# Patient Record
Sex: FEMALE | Race: WHITE | URBAN - METROPOLITAN AREA
[De-identification: names, ages, dates, MRNs, and addresses within clinical notes are randomized per-mention and may not be internally consistent; named-entity substitution may affect disease eponyms.]

---
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
## 2018-02-06 ENCOUNTER — HOSPITAL ENCOUNTER (INPATIENT)
Facility: HOSPITAL | Age: 18
LOS: 6 days | Discharge: HOME | DRG: 737 | End: 2018-02-12
Attending: INTERNAL MEDICINE | Admitting: INTERNAL MEDICINE
Payer: COMMERCIAL

## 2018-02-06 ENCOUNTER — LAB ENCOUNTER (OUTPATIENT)
Dept: LAB | Facility: HOSPITAL | Age: 18
End: 2018-02-06
Attending: INTERNAL MEDICINE
Payer: COMMERCIAL

## 2018-02-06 ENCOUNTER — HOSPITAL ENCOUNTER (OUTPATIENT)
Dept: CT IMAGING | Facility: HOSPITAL | Age: 18
Discharge: HOME | End: 2018-02-06
Attending: INTERNAL MEDICINE
Payer: COMMERCIAL

## 2018-02-06 DIAGNOSIS — K42.9 UMBILICAL HERNIA: ICD-10-CM

## 2018-02-06 DIAGNOSIS — R10.9 ABDOMINAL PAIN: Primary | ICD-10-CM

## 2018-02-06 LAB
ALBUMIN SERPL-MCNC: 3.7 G/DL (ref 3.5–4.8)
ALP LIVER SERPL-CCNC: 76 U/L (ref 52–144)
ALT SERPL-CCNC: 23 U/L (ref 14–54)
AST SERPL-CCNC: 19 U/L (ref 15–41)
BILIRUB SERPL-MCNC: 0.4 MG/DL (ref 0.1–2)
BUN BLD-MCNC: 11 MG/DL (ref 8–20)
CALCIUM BLD-MCNC: 9.6 MG/DL (ref 8.3–10.3)
CHLORIDE: 103 MMOL/L (ref 101–111)
CO2: 27 MMOL/L (ref 22–32)
CREAT BLD-MCNC: 0.88 MG/DL (ref 0.55–1.02)
GLUCOSE BLD-MCNC: 80 MG/DL (ref 70–99)
M PROTEIN MFR SERPL ELPH: 8.5 G/DL (ref 6.1–8.3)
POTASSIUM SERPL-SCNC: 4.2 MMOL/L (ref 3.6–5.1)
SODIUM SERPL-SCNC: 137 MMOL/L (ref 136–144)

## 2018-02-06 PROCEDURE — 36415 COLL VENOUS BLD VENIPUNCTURE: CPT

## 2018-02-06 PROCEDURE — 74150 CT ABDOMEN W/O CONTRAST: CPT | Performed by: INTERNAL MEDICINE

## 2018-02-06 PROCEDURE — 80053 COMPREHEN METABOLIC PANEL: CPT

## 2018-02-06 RX ORDER — BETAMETHASONE DIPROPIONATE 0.5 MG/G
CREAM TOPICAL 2 TIMES DAILY
Status: DISCONTINUED | OUTPATIENT
Start: 2018-02-06 | End: 2018-02-12

## 2018-02-06 RX ORDER — ACETAMINOPHEN 325 MG/1
650 TABLET ORAL EVERY 4 HOURS PRN
Status: DISPENSED | OUTPATIENT
Start: 2018-02-06 | End: 2018-02-07

## 2018-02-07 ENCOUNTER — APPOINTMENT (OUTPATIENT)
Dept: ULTRASOUND IMAGING | Facility: HOSPITAL | Age: 18
DRG: 737 | End: 2018-02-07
Attending: INTERNAL MEDICINE
Payer: COMMERCIAL

## 2018-02-07 ENCOUNTER — APPOINTMENT (OUTPATIENT)
Dept: CT IMAGING | Facility: HOSPITAL | Age: 18
DRG: 737 | End: 2018-02-07
Attending: PHYSICIAN ASSISTANT
Payer: COMMERCIAL

## 2018-02-07 LAB
AFP TUMOR MARKER: 5.7 NG/ML (ref ?–8)
ALBUMIN, OTHER BODY FLUID: 3.4 G/DL
AMYLASE, OTHER BODY FLUID: 27 U/L
AMYLASE: 35 U/L (ref 25–115)
APTT PPP: 28.6 SECONDS (ref 25–34)
BASOPHIL PERITONEAL FLUID: 0 %
BASOPHILS # BLD AUTO: 0.06 X10(3) UL (ref 0–0.1)
BASOPHILS NFR BLD AUTO: 0.6 %
C-REACTIVE PROTEIN: 1.28 MG/DL (ref ?–1)
CANCER AG 125 (CA125): 296.5 U/ML (ref ?–35)
CEA: 0.4 NG/ML (ref 0.5–5)
CMV AB, IGM: NEGATIVE
CYTOMEGALOVIRUS AB, IGG: NEGATIVE
EBNA: NEGATIVE
EBV VCA IGG: POSITIVE
EBV VCA IGM: NEGATIVE
EOSINOPHIL # BLD AUTO: 0.17 X10(3) UL (ref 0–0.3)
EOSINOPHIL NFR BLD AUTO: 1.7 %
EOSINOPHILS PERITONEAL FLUID: 0 %
ERYTHROCYTE [DISTWIDTH] IN BLOOD BY AUTOMATED COUNT: 13.3 % (ref 11.5–16)
GLUCOSE PERITONEAL FLUID: 85 MG/DL
HAV IGM SER QL: NONREACTIVE
HBV CORE IGM SER QL: NONREACTIVE
HBV SURFACE AG SERPL QL IA: NONREACTIVE
HCT VFR BLD AUTO: 43.7 % (ref 34–50)
HEPATITIS C VIRUS AB INTERPRETATION: NONREACTIVE
HGB BLD-MCNC: 14.3 G/DL (ref 12–16)
IMMATURE GRANULOCYTE COUNT: 0.03 X10(3) UL (ref 0–1)
IMMATURE GRANULOCYTE RATIO %: 0.3 %
INR BLD: 1.07 (ref 0.89–1.11)
LDH PERITONEAL FLUID: 164 U/L
LDH: 194 U/L (ref 84–246)
LIPASE: 133 U/L (ref 73–393)
LYMPHOCYTE PERITONEAL FLUID: 38 %
LYMPHOCYTES # BLD AUTO: 2.64 X10(3) UL (ref 0.9–4)
LYMPHOCYTES NFR BLD AUTO: 27.1 %
Lab: 65 MG/DL
MCH RBC QN AUTO: 27.8 PG (ref 27–33.2)
MCHC RBC AUTO-ENTMCNC: 32.7 G/DL (ref 31–37)
MCV RBC AUTO: 84.9 FL (ref 81–100)
MONO/MAC/HISTIO PERITONEAL: 57 %
MONOCYTES # BLD AUTO: 0.6 X10(3) UL (ref 0.1–0.6)
MONOCYTES NFR BLD AUTO: 6.2 %
NEUTROPHIL ABS PRELIM: 6.25 X10 (3) UL (ref 1.3–6.7)
NEUTROPHILS # BLD AUTO: 6.25 X10(3) UL (ref 1.3–6.7)
NEUTROPHILS NFR BLD AUTO: 64.1 %
NEUTROPHILS PERITONEAL FLUID: 5 %
PLATELET # BLD AUTO: 504 10(3)UL (ref 150–450)
PRO-BETA NATRIURETIC PEPTIDE: 42 PG/ML (ref ?–125)
PSA SERPL DL<=0.01 NG/ML-MCNC: 13.9 SECONDS (ref 12–14.3)
RBC # BLD AUTO: 5.15 X10(6)UL (ref 3.8–5.1)
RBC PERITONEAL FLUID: <3000 /MM3
RED CELL DISTRIBUTION WIDTH-SD: 41.8 FL (ref 35.1–46.3)
SED RATE-ML: 12 MM/HR (ref 0–25)
TOTAL CELLS COUNTED: 100
TOTAL PROTEIN, OTHER BODY FLUID: 6 G/DL
WBC # BLD AUTO: 9.8 X10(3) UL (ref 4–13)
WBC PERITONEAL FLUID: 552 /MM3

## 2018-02-07 PROCEDURE — 76856 US EXAM PELVIC COMPLETE: CPT | Performed by: INTERNAL MEDICINE

## 2018-02-07 PROCEDURE — 0W9G3ZZ DRAINAGE OF PERITONEAL CAVITY, PERCUTANEOUS APPROACH: ICD-10-PCS | Performed by: RADIOLOGY

## 2018-02-07 PROCEDURE — 49083 ABD PARACENTESIS W/IMAGING: CPT | Performed by: INTERNAL MEDICINE

## 2018-02-07 PROCEDURE — 74177 CT ABD & PELVIS W/CONTRAST: CPT | Performed by: PHYSICIAN ASSISTANT

## 2018-02-07 RX ORDER — ONDANSETRON 2 MG/ML
4 INJECTION INTRAMUSCULAR; INTRAVENOUS EVERY 6 HOURS PRN
Status: DISCONTINUED | OUTPATIENT
Start: 2018-02-07 | End: 2018-02-09

## 2018-02-07 RX ORDER — ACETAMINOPHEN 325 MG/1
650 TABLET ORAL EVERY 6 HOURS PRN
Status: DISCONTINUED | OUTPATIENT
Start: 2018-02-07 | End: 2018-02-09 | Stop reason: HOSPADM

## 2018-02-07 RX ORDER — HYDROMORPHONE HYDROCHLORIDE 1 MG/ML
1 INJECTION, SOLUTION INTRAMUSCULAR; INTRAVENOUS; SUBCUTANEOUS EVERY 2 HOUR PRN
Status: DISCONTINUED | OUTPATIENT
Start: 2018-02-07 | End: 2018-02-09

## 2018-02-07 NOTE — PROGRESS NOTES
ALERT & ORIENTED X 4 . WITH TOLERABLE PAIN. KEPT NPO. ABDOMEN DISTENDED BUT SOFT. UP IN ROOM . PLAN OF CARE DISCUSSED WITH PATIENT. WILL MONITOR.

## 2018-02-07 NOTE — PAYOR COMM NOTE
--------------  ADMISSION REVIEW     Payor: [employer] [facility]  Subscriber #:  [id number]  Authorization Number: N/A    Admit date: 2/6/18  Admit time: 2005       Admitting Physician: Washington Talavera MD  Attending Physician:  Washington Talavera MD  Primary Care Ph arthroscopy in 2012 and lower extremity cellulitis in 2014. MEDICATIONS:  Triamcinolone acetomide 1.2% cream application to plaques b.i.d. as needed, Advil 200 mg as needed, Otezla 30 mg 1 tablet twice a day.     ALLERGIES:  The patient has no known drug CT of the abdomen and pelvis was read as follows: There is a large amount of ascites present in the abdomen around the liver and spleen as well as in the flanks and pericolic regions. Some fluid extends into the umbilical hernia.   A pocket of fluid in t HYDROmorphone HCl PF (DILAUDID) 1 MG/ML injection 1 mg     Date Action Dose Route User    2/7/2018 4076 Given 1 mg Intravenous Sherice Bella RN      ondansetron HCl [facility]) injection 4 mg     Date Action Dose Route User    2/7/2018 0843 Given 4 mg Intra amount of ascites. Some of the ascites fluid extends into midline abdominal wall periumbilical and supraumbilical hernias containing fluid.   No oral contrast or intravenous contrast administered with limitations thereof, but there is   no definite bowel c

## 2018-02-07 NOTE — CONSULTS
[facility]                       Gastroenterology [address] [facility]    Jose Tang Patient Status:  Inpatient    2000 MRN [id number]   [facility] 3NW-A Attending Shira Warren MD   [facility] Day # 1 PCP Dino Sawant chloride 20 mEq in sodium chloride 0.45 % 1,000 mL infusion  Intravenous Continuous   influenza vaccine split quad (FLULAVAL) ages 6 months to 65 years inj 0.5ml 0.5 mL Intramuscular Prior to discharge   [] acetaminophen (TYLENOL) tab 650 mg 650 mg kg/m²   Gen: AAO x 3, able to speak in complete sentences  HENT: EOMI, PERRL, oropharynx is clear with moist mucosal membranes  Eyes: Sclerae are anicteric  Neck:  Supple without nuchal rigidity  CV: Regular rate and rhythm, with normal S1 and S2  Resp: Cl umbilical pain. The patient also states   generalized upper abdominal pain. FINDINGS:       This scan performed without IV or oral contrast, with limitations thereof.      Large amount of ascites present in the abdomen around the liver, spleen, as w cytology, albumin, protein, glucose, LDH, triglycerides, and amylase   2. Agree with pelvic US     Thank you for the consultation, we will follow the patient with you.   WILFRED Quinn  8:51 AM  2/7/2018  [facility]  [phone]    Atte

## 2018-02-07 NOTE — PROGRESS NOTES
[facility]  Report of Consultation    Garcia Mendoza Patient Status:  Inpatient    2000 MRN [id number]   [facility] 3NW-A Attending Ger Lane MD   Hosp Day # 1 PCP nAnette Tolbert MD     Reason for Consultation:    Ulysses Meline facility-administered medications on file prior to encounter.    Current Outpatient Prescriptions on File Prior to Encounter:  Betamethasone Dipropionate 0.05 % External Ointment Apply to AA BID two weeks on two weeks off, then PRN Disp: 50 g Rfl: 3   Angely CT with complaints of vomiting bile this morning. The patient states vomiting on and off since December. The patient states umbilical pain. The patient also states generalized upper abdominal pain.     FINDINGS:   This scan performed without IV or oral cont pain, unspecified chronicity      Impression:    22 y/o with umbilical hernia, ascites   -CT scan as noted above  -afebrile, no leukocystosis, : 296    Plan:    Patient to undergo paracentesis this afternoon as well as pelvic U/S  Would consider CT a

## 2018-02-07 NOTE — H&P
[facility]    PATIENT'S NAME: Marlin Park   ATTENDING PHYSICIAN: Chapo Parikh M.D.    PATIENT ACCOUNT#:   [de-identified]    LOCATION:  [address] [place] [address]  MEDICAL RECORD #:   [id number]       YOB: 2000  ADMISSION DATE:       02/06/2018 smoked. Does not use alcohol or recreational drugs. REVIEW OF SYSTEMS:  Other 14-point review of systems is negative. Of note, the patient did begin vomiting before she began the [place].       PHYSICAL EXAMINATION:    GENERAL:  An alert white female aneurysm. There is no definite bowel-containing hernia. ASSESSMENT AND PLAN:    1. A 21year-old white female, previously healthy, now with periodic vomiting and ascites. Plan is to admit.   Gastrointestinal evaluation, probable paracentesis, and pel

## 2018-02-07 NOTE — PLAN OF CARE
GASTROINTESTINAL - ADULT    • Minimal or absence of nausea and vomiting Progressing        PAIN - ADULT    • Verbalizes/displays adequate comfort level or patient's stated pain goal Progressing        BACK FROM GI LAB S/P PARACENTESIS AT 1430. ABDOMEN LESS

## 2018-02-07 NOTE — PROGRESS NOTES
[facility]  Progress Note      Husam Araya Patient Status:  Inpatient    2000 MRN [id number]   [facility] 3NW-A Attending Cicero Fabry, MD   Hosp Day # 1 PCP Joana Cesar MD     INDICATIONS: ascites    DESCRIPTION OF VT

## 2018-02-07 NOTE — PROGRESS NOTES
Admission database completed. Dr. Drake Mares in to see patient, verbal orders taken. Reports ok to give oral tylenol 650 q4 hours prn for pain with sips of water til 2/7/2018 0600. Change dressing to left knee BID with patients own Aqua 4 and band-aids.

## 2018-02-07 NOTE — PROGRESS NOTES
[facility]    Progress Note    Husam Araya Patient Status:  Inpatient    2000 MRN [id number]   [facility] 3NW-A Attending Cicero Fabry, MD   Hosp Day # 1 PCP Joana Cesar MD       SUBJECTIVE:  Patient seen earlier rian shape, and echogenicity. RIGHT OVARY:  The right adnexal mass measures 9.8 x 8.0 x 6.8 cm. LEFT OVARY:  2.9 x 1.9 x 2.6    1.8 cm left ovarian cyst is noted. CUL-DE-SAC:  Moderate amount of free fluid is noted.   OTHER:  Negative.       =====  CONCLUSI Patient has mildly nonspecifically elevated , extensive ascites. Additional CT with contrast in progress. GYN oncology has been consulted  2. Abdominal pain- continue to treat with analgesics as needed  3.   Ascites–this has been aspirated; apparen

## 2018-02-07 NOTE — PLAN OF CARE
GASTROINTESTINAL - ADULT    • Minimal or absence of nausea and vomiting Progressing        PAIN - ADULT    • Verbalizes/displays adequate comfort level or patient's stated pain goal Progressing        Pt NPO, abdomen softly distended, firm bulge noted at u

## 2018-02-08 ENCOUNTER — ANESTHESIA EVENT (OUTPATIENT)
Dept: SURGERY | Facility: HOSPITAL | Age: 18
DRG: 737 | End: 2018-02-08
Payer: COMMERCIAL

## 2018-02-08 LAB
ANTIBODY SCREEN: NEGATIVE
RH BLOOD TYPE: POSITIVE

## 2018-02-08 PROCEDURE — 99254 IP/OBS CNSLTJ NEW/EST MOD 60: CPT | Performed by: INTERNAL MEDICINE

## 2018-02-08 RX ORDER — HEPARIN SODIUM 5000 [USP'U]/ML
5000 INJECTION, SOLUTION INTRAVENOUS; SUBCUTANEOUS ONCE
Status: COMPLETED | OUTPATIENT
Start: 2018-02-09 | End: 2018-02-09

## 2018-02-08 RX ORDER — MAGNESIUM CARB/ALUMINUM HYDROX 105-160MG
296 TABLET,CHEWABLE ORAL ONCE
Status: COMPLETED | OUTPATIENT
Start: 2018-02-08 | End: 2018-02-08

## 2018-02-08 RX ORDER — ZOLPIDEM TARTRATE 10 MG/1
10 TABLET ORAL NIGHTLY PRN
Status: DISCONTINUED | OUTPATIENT
Start: 2018-02-08 | End: 2018-02-09

## 2018-02-08 RX ORDER — CEFAZOLIN SODIUM/WATER 2 G/20 ML
2 SYRINGE (ML) INTRAVENOUS ONCE
Status: DISCONTINUED | OUTPATIENT
Start: 2018-02-09 | End: 2018-02-11 | Stop reason: ALTCHOICE

## 2018-02-08 NOTE — PROGRESS NOTES
Gastroenterology Progress Note  Patient Name: Nanci Byrne  Chief Complaint: ascites, R adnexal mass  S: Pt reports that her abdominal pain improved after the paracentesis. She was seen by Dr. Angel Leon this am. Her mother is at the bedside.    O: /76 (B or causes discomfort  3.  Awaiting cytology from peritoneal fluid    Total time spent in the care of the patient today: 26 minutes    Kristina Hernandez DO  11:55 AM  2/8/2018  [facility]enter[facility]  [phone]

## 2018-02-08 NOTE — PLAN OF CARE
GASTROINTESTINAL - ADULT    • Minimal or absence of nausea and vomiting Progressing        PAIN - ADULT    • Verbalizes/displays adequate comfort level or patient's stated pain goal Progressing        Pt tolerated regular diet well for dinner, taking fluid

## 2018-02-08 NOTE — PROGRESS NOTES
[facility]    Progress Note    Chantal Patel Patient Status:  Inpatient    2000 MRN [id number]   [facility] 3NW-A Attending Pooja Zapata MD   Hosp Day # 2 PCP To Prescott MD       SUBJECTIVE:  No CP, SOB, or N/V.PT SE RETROPERITONEUM:  No mass or adenopathy. BOWEL/MESENTERY:  No dilated loops of small bowel are seen. Contrast material is present within the colon. Moderate amount of intra-abdominal ascites is present.   There is some induration of the greater oment 650 mg 650 mg Oral Q6H PRN   betamethasone dipropionate (DIPROSONE) 0.05 % cream  Topical BID   potassium chloride 20 mEq in sodium chloride 0.45 % 1,000 mL infusion  Intravenous Continuous   influenza vaccine split quad (FLULAVAL) ages 6 months to 72 year

## 2018-02-08 NOTE — PLAN OF CARE
GASTROINTESTINAL - ADULT    • Minimal or absence of nausea and vomiting Progressing        PAIN - ADULT    • Verbalizes/displays adequate comfort level or patient's stated pain goal Progressing        Vss. Pt a&ox3. Pt on ra with 02 sats wnl. Lungs cta.  Pt

## 2018-02-08 NOTE — CONSULTS
Hematology-Oncology Consultation Note    Patient Name: Germaine Sharma   YOB: 2000   Medical Record Number: [id number]   CSN: [id number]   Consulting Physician: Eusebia Puente MD  Date of Consultation: 2/8/2018     Reason for Consultation:  Amarilis Silveira Allergies:   No Known Allergies    Current Medications:  • [START ON 2/9/2018] Heparin Sodium (Porcine)  5,000 Units Subcutaneous Once   • [START ON 2/9/2018] ceFAZolin  2 g Intravenous Once   • magnesium citrate  296 mL Oral Once   • betamethasone d 1450    .0 (H) 02/06/2018 2353    .0 06/16/2014 1450       Lab Component   Component Value Date/Time    GLUCOSE 99 06/16/2014 1450    Glucose 80 02/06/2018 1509    BUN 11 02/06/2018 1509    BUN 9 06/16/2014 1450    CREATININE 0.78 06/16/2014 LIVER:  No enlargement, atrophy, abnormal density, or significant focal lesion. BILIARY:  No visible dilatation or calcification. PANCREAS:  No lesion, fluid collection, ductal dilatation, or atrophy. SPLEEN:  No enlargement or focal lesion. correlation is suggested. Continued followup is recommended.           Dictated by: Trenton Ontiveros MD on 2/07/2018 at 17:47       Approved by: Trenton Ontiveros MD           PROCEDURE:  US PELVIS (TRANSABDOMINAL PELVIS) (BLG=[id number])     COMPARISON:  [facility] , CT ABDOME

## 2018-02-08 NOTE — ANESTHESIA PREPROCEDURE EVALUATION
PRE-OP EVALUATION    Patient Name: Lola Jara    Pre-op Diagnosis: Right adnexal mass    Procedure(s):  EXPLORATORY LAPAROTOMY OPEN, REMOVAL OF PELVIC MASS FROZEN SECTION, POSSIBLE HYSTERECTOMY, SALPINGO-OOPHORECTOMY, STAGING PROCEDURE    Surgeon(s) and Cardiovascular        Exercise tolerance: good     MET: >4    (+) obesity                                       Endo/Other    Negative endo/other ROS. Pulmonary    Negative pulmonary ROS. exam normal.  Breath sounds clear to auscultation bilaterally. Other findings  IV left arm          ASA: 3   Plan: general and epidural  NPO status verified and patient meets guidelines.     Post-procedure pain management plan discussed with s

## 2018-02-08 NOTE — CONSULTS
[address]    PATIENT'S NAME: Bettylou Paget   ATTENDING PHYSICIAN: Petar Brown M.D.   CONSULTING PHYSICIAN: Satinder Kohler M.D.    PATIENT ACCOUNT#:   [de-identified]    LOCATION:  [address] [place] [address]  MEDICAL RECORD #:   [id number]       DATE OF BIRTH: Ascites that had been drained. PLAN:    1. Recommended exploratory laparotomy, removal of the mass with frozen section. If malignant, staging procedure as indicated.   2.   Surgery will be as conservative as possible trying to preserve the uterus a

## 2018-02-08 NOTE — PAYOR COMM NOTE
--------------  CONTINUED STAY REVIEW    Payor: [employer]  Subscriber #:  [id number]  Authorization Number: N/A    Admit date: 2/6/18  Admit time: 2005    Admitting Physician: Hattie Sands MD  Attending Physician:  Hattie Sands MD  Primary Care P name:  N/A     Years of education: N/A   Number of children: N/A      Occupational History  None on file      Social History Main Topics                    Smoking status: Never Smoker                   Smokeless tobacco: Never Used                 Alcohol 2353     MEAN CELL VOLUME 86.8 06/16/2014 1450     MCH 27.8 02/06/2018 2353     Mean Corpuscular Hemoglobin 29.9 06/16/2014 1450     MCHC 32.7 02/06/2018 2353     Mean Corpuscular HGB Conc 34.4 06/16/2014 1450     RDW 13.3 02/06/2018 2353     RED CELL DIST symphysis with non-ionic intravenous contrast material. Post contrast coronal MPR imaging was performed.  Dose reduction techniques were used.  Dose information is   transmitted to the [facility] ([address] [place] [address] of Radiology) Madi Lim 35 ([facility] fracture.    LUNG BASES:  No visible pulmonary or pleural disease.    OTHER:  Negative.       =====  CONCLUSION: La Feria Lout is nodular enhancing soft tissue centered on the umbilicus.  This has a somewhat nonspecific in appearance.  Differential considerations They are aware findings suspicious for malignancy and further staging w/u pending results of resection/biopsy.  They did request that if chemotherapy were indicated/recommended that they would prefer to receive this closer to home- lives in [place].

## 2018-02-09 ENCOUNTER — SURGERY (OUTPATIENT)
Age: 18
End: 2018-02-09

## 2018-02-09 ENCOUNTER — ANESTHESIA (OUTPATIENT)
Dept: SURGERY | Facility: HOSPITAL | Age: 18
DRG: 737 | End: 2018-02-09
Payer: COMMERCIAL

## 2018-02-09 PROBLEM — Z98.890 POSTOPERATIVE STATE: Status: ACTIVE | Noted: 2018-02-09

## 2018-02-09 LAB
BETA 2 GLYCOPROTEIN 1 AB, IGG: <3.7 U/ML (ref ?–15)
BETA 2 GLYCOPROTEIN 1 AB, IGM: <3.7 U/ML (ref ?–15)
HCG URINE QUALITATIVE: NEGATIVE
PHOSPHOLIPID AB, IGG: NEGATIVE
PHOSPHOLIPID AB, IGM: NEGATIVE

## 2018-02-09 PROCEDURE — 0UT50ZZ RESECTION OF RIGHT FALLOPIAN TUBE, OPEN APPROACH: ICD-10-PCS | Performed by: OBSTETRICS & GYNECOLOGY

## 2018-02-09 PROCEDURE — 0DBU0ZZ EXCISION OF OMENTUM, OPEN APPROACH: ICD-10-PCS | Performed by: OBSTETRICS & GYNECOLOGY

## 2018-02-09 PROCEDURE — 0WBF0ZZ EXCISION OF ABDOMINAL WALL, OPEN APPROACH: ICD-10-PCS | Performed by: OBSTETRICS & GYNECOLOGY

## 2018-02-09 PROCEDURE — 0UT00ZZ RESECTION OF RIGHT OVARY, OPEN APPROACH: ICD-10-PCS | Performed by: OBSTETRICS & GYNECOLOGY

## 2018-02-09 PROCEDURE — 99232 SBSQ HOSP IP/OBS MODERATE 35: CPT | Performed by: INTERNAL MEDICINE

## 2018-02-09 RX ORDER — SODIUM CHLORIDE, SODIUM LACTATE, POTASSIUM CHLORIDE, CALCIUM CHLORIDE 600; 310; 30; 20 MG/100ML; MG/100ML; MG/100ML; MG/100ML
INJECTION, SOLUTION INTRAVENOUS CONTINUOUS
Status: DISCONTINUED | OUTPATIENT
Start: 2018-02-09 | End: 2018-02-09 | Stop reason: HOSPADM

## 2018-02-09 RX ORDER — MEPERIDINE HYDROCHLORIDE 25 MG/ML
12.5 INJECTION INTRAMUSCULAR; INTRAVENOUS; SUBCUTANEOUS AS NEEDED
Status: DISCONTINUED | OUTPATIENT
Start: 2018-02-09 | End: 2018-02-09 | Stop reason: HOSPADM

## 2018-02-09 RX ORDER — MORPHINE SULFATE 2 MG/ML
2 INJECTION, SOLUTION INTRAMUSCULAR; INTRAVENOUS EVERY 2 HOUR PRN
Status: DISCONTINUED | OUTPATIENT
Start: 2018-02-09 | End: 2018-02-10

## 2018-02-09 RX ORDER — NALOXONE HYDROCHLORIDE 0.4 MG/ML
0.08 INJECTION, SOLUTION INTRAMUSCULAR; INTRAVENOUS; SUBCUTANEOUS
Status: DISCONTINUED | OUTPATIENT
Start: 2018-02-09 | End: 2018-02-12

## 2018-02-09 RX ORDER — MORPHINE SULFATE 10 MG/ML
INJECTION, SOLUTION INTRAMUSCULAR; INTRAVENOUS
Status: COMPLETED
Start: 2018-02-09 | End: 2018-02-09

## 2018-02-09 RX ORDER — DIPHENHYDRAMINE HYDROCHLORIDE 50 MG/ML
12.5 INJECTION INTRAMUSCULAR; INTRAVENOUS EVERY 4 HOURS PRN
Status: DISCONTINUED | OUTPATIENT
Start: 2018-02-09 | End: 2018-02-09

## 2018-02-09 RX ORDER — ZOLPIDEM TARTRATE 5 MG/1
5 TABLET ORAL NIGHTLY PRN
Status: DISCONTINUED | OUTPATIENT
Start: 2018-02-09 | End: 2018-02-12

## 2018-02-09 RX ORDER — NALOXONE HYDROCHLORIDE 0.4 MG/ML
80 INJECTION, SOLUTION INTRAMUSCULAR; INTRAVENOUS; SUBCUTANEOUS AS NEEDED
Status: DISCONTINUED | OUTPATIENT
Start: 2018-02-09 | End: 2018-02-09 | Stop reason: HOSPADM

## 2018-02-09 RX ORDER — DEXAMETHASONE SODIUM PHOSPHATE 4 MG/ML
VIAL (ML) INJECTION
Status: COMPLETED
Start: 2018-02-09 | End: 2018-02-09

## 2018-02-09 RX ORDER — PROCHLORPERAZINE MALEATE 10 MG
10 TABLET ORAL EVERY 12 HOURS PRN
Status: DISCONTINUED | OUTPATIENT
Start: 2018-02-09 | End: 2018-02-12

## 2018-02-09 RX ORDER — PROCHLORPERAZINE 25 MG
25 SUPPOSITORY, RECTAL RECTAL EVERY 12 HOURS PRN
Status: DISCONTINUED | OUTPATIENT
Start: 2018-02-09 | End: 2018-02-12

## 2018-02-09 RX ORDER — KETOROLAC TROMETHAMINE 30 MG/ML
30 INJECTION, SOLUTION INTRAMUSCULAR; INTRAVENOUS EVERY 6 HOURS PRN
Status: ACTIVE | OUTPATIENT
Start: 2018-02-09 | End: 2018-02-10

## 2018-02-09 RX ORDER — DEXAMETHASONE SODIUM PHOSPHATE 4 MG/ML
4 VIAL (ML) INJECTION AS NEEDED
Status: DISCONTINUED | OUTPATIENT
Start: 2018-02-09 | End: 2018-02-09 | Stop reason: HOSPADM

## 2018-02-09 RX ORDER — DIPHENHYDRAMINE HYDROCHLORIDE 50 MG/ML
12.5 INJECTION INTRAMUSCULAR; INTRAVENOUS EVERY 4 HOURS PRN
Status: DISCONTINUED | OUTPATIENT
Start: 2018-02-09 | End: 2018-02-12

## 2018-02-09 RX ORDER — ONDANSETRON 2 MG/ML
4 INJECTION INTRAMUSCULAR; INTRAVENOUS EVERY 8 HOURS PRN
Status: DISCONTINUED | OUTPATIENT
Start: 2018-02-09 | End: 2018-02-12

## 2018-02-09 RX ORDER — ONDANSETRON 2 MG/ML
4 INJECTION INTRAMUSCULAR; INTRAVENOUS AS NEEDED
Status: DISCONTINUED | OUTPATIENT
Start: 2018-02-09 | End: 2018-02-09 | Stop reason: HOSPADM

## 2018-02-09 RX ORDER — ONDANSETRON 2 MG/ML
INJECTION INTRAMUSCULAR; INTRAVENOUS
Status: COMPLETED
Start: 2018-02-09 | End: 2018-02-09

## 2018-02-09 RX ORDER — ONDANSETRON 2 MG/ML
4 INJECTION INTRAMUSCULAR; INTRAVENOUS EVERY 6 HOURS PRN
Status: DISCONTINUED | OUTPATIENT
Start: 2018-02-09 | End: 2018-02-11

## 2018-02-09 RX ORDER — DEXTROSE, SODIUM CHLORIDE, AND POTASSIUM CHLORIDE 5; .9; .15 G/100ML; G/100ML; G/100ML
INJECTION INTRAVENOUS CONTINUOUS
Status: DISCONTINUED | OUTPATIENT
Start: 2018-02-09 | End: 2018-02-12

## 2018-02-09 RX ORDER — ONDANSETRON 4 MG/1
4 TABLET, FILM COATED ORAL EVERY 8 HOURS PRN
Status: DISCONTINUED | OUTPATIENT
Start: 2018-02-09 | End: 2018-02-12

## 2018-02-09 RX ORDER — MORPHINE SULFATE 2 MG/ML
2 INJECTION, SOLUTION INTRAMUSCULAR; INTRAVENOUS EVERY 5 MIN PRN
Status: DISCONTINUED | OUTPATIENT
Start: 2018-02-09 | End: 2018-02-09 | Stop reason: HOSPADM

## 2018-02-09 RX ORDER — NALBUPHINE HCL 10 MG/ML
2.5 AMPUL (ML) INJECTION EVERY 4 HOURS PRN
Status: DISCONTINUED | OUTPATIENT
Start: 2018-02-09 | End: 2018-02-11

## 2018-02-09 RX ORDER — MIDAZOLAM HYDROCHLORIDE 1 MG/ML
1 INJECTION INTRAMUSCULAR; INTRAVENOUS EVERY 5 MIN PRN
Status: DISCONTINUED | OUTPATIENT
Start: 2018-02-09 | End: 2018-02-09 | Stop reason: HOSPADM

## 2018-02-09 RX ORDER — CEFAZOLIN SODIUM/WATER 2 G/20 ML
2 SYRINGE (ML) INTRAVENOUS EVERY 8 HOURS
Status: COMPLETED | OUTPATIENT
Start: 2018-02-09 | End: 2018-02-10

## 2018-02-09 NOTE — PROGRESS NOTES
[facility]    Progress Note    Lola Stands Patient Status:  Inpatient    2000 MRN [id number]   [facility] SURGERY Attending Memo Alatorre MD   Hosp Day # 3 PCP Corinne Alvarez MD       SUBJECTIVE:  Patient is currently i tear     Sprain of calcaneofibular (ligament) of ankle     Osteochondritis dissecans     Hallux valgus (acquired)     Lumbar strain     Fall during sporting event     Osteochondroma     Left hip pain     Cellulitis     Left knee pain, unspecified chronicit

## 2018-02-09 NOTE — PROGRESS NOTES
Gastroenterology Progress Note  Patient Name: Tim Noyola  Chief Complaint: ascites, R adnexal mass and umbilical mass  S: Pt reports that she feels great after surgery. She denies abdominal pain.    O: BP 96/48 (BP Location: Right arm)   Pulse 90   Temp (

## 2018-02-09 NOTE — PROGRESS NOTES
Pt arrived back to unit at this time from PACU. Alert and oriented x4. PCEA infusing, pt states adequate pain control. Denies nausea. Denies passing gas. Clear liquid menu provided, pt states that she is hungry.  Educated her on advancing diet slowly, pt st

## 2018-02-09 NOTE — PROGRESS NOTES
[facility]  Progress Note    Terri Meyers Patient Status:  Inpatient    2000 MRN [id number]   [facility] 3NW-A Attending Fernando Holden MD   Hosp Day # 2 PCP Mar Vincent MD     Subjective:  Patient is in bed and mother a

## 2018-02-09 NOTE — PLAN OF CARE
PAIN - ADULT    • Verbalizes/displays adequate comfort level or patient's stated pain goal Progressing          GASTROINTESTINAL - ADULT    • Minimal or absence of nausea and vomiting Progressing        Tolerated FLD for dinner w/o issue,instructed on noth

## 2018-02-09 NOTE — PROGRESS NOTES
Heme/Onc Progress Note    Patient Name: Darlene Madrigal   YOB: 2000   Medical Record Number: [id number]   CSN: [id number]   Attending Physician: Sherri Johnson M.D. Subjective:  Doing ok.  A little anxious    Objective:    Vitals:   02/08/18 today. Further staging w/u pending results.     MD Tin Mckeon [facility]

## 2018-02-09 NOTE — OPERATIVE REPORT
[facility]    PATIENT'S NAME: Marlin Park   ATTENDING PHYSICIAN: Chapo Parikh M.D. OPERATING PHYSICIAN: Frances Garcia M.D.    PATIENT ACCOUNT#:   [de-identified]    LOCATION:  [address] [place] [address]  MEDICAL RECORD #:   [id number]       DATE OF BIRTH: ureter. The ureter was dissected away and the infundibulopelvic ligament was isolated, clamped, and ligated. The middle end of the tube and ovarian ligament were cut and specimen was isolated, delivered, and sent for pathologic examination.   The patholog

## 2018-02-09 NOTE — PROGRESS NOTES
Patient left for OR at this time in stable condition. Consent signed and on chart. Abx on chart. IV fluids infusing. Pregnancy urine test sent to lab prior to pt leaving unit. NPO since 10 PM. Family at the bedside.

## 2018-02-09 NOTE — ANESTHESIA POSTPROCEDURE EVALUATION
[address] Patient Status:  Inpatient   Age/Gender 21year old female MRN [id number]   Location [facility] Attending Daryl William MD   Hosp Day # 3 PCP Yola Angeles MD       Anesthesia Post-op N

## 2018-02-09 NOTE — PROGRESS NOTES
[facility] Pharmacy Note:  Pain Consult    Jf Lafleur is a 21year old female started on Morphine/Bupivacaine PCEA by Dr. Kassie Brunner. Pharmacy was consulted to review medication profile and to discontinue previously ordered narcotics and sedatives.     Medication pr

## 2018-02-09 NOTE — PROGRESS NOTES
[facility]  Brief Op Note    Ane Aus Location: OR   CSN [id number] MRN [id number]   Admission Date 2/6/2018 Operation Date 2/9/2018   Attending Physician Francine Mccallum MD Operating Physician Padmini Guerra MD     Pre-Operative Diagnosis: Salty Masker

## 2018-02-10 LAB
DRVVT LUPUS ANTICOAGULANT: NEGATIVE
DRVVT SCREEN RATIO: 0.98 (ref 0–1.29)
ERYTHROCYTE [DISTWIDTH] IN BLOOD BY AUTOMATED COUNT: 13.1 % (ref 11.5–16)
HCT VFR BLD AUTO: 39.1 % (ref 34–50)
HGB BLD-MCNC: 12.9 G/DL (ref 12–16)
MCH RBC QN AUTO: 28.4 PG (ref 27–33.2)
MCHC RBC AUTO-ENTMCNC: 33 G/DL (ref 31–37)
MCV RBC AUTO: 85.9 FL (ref 81–100)
PLATELET # BLD AUTO: 422 10(3)UL (ref 150–450)
RBC # BLD AUTO: 4.55 X10(6)UL (ref 3.8–5.1)
RED CELL DISTRIBUTION WIDTH-SD: 40.7 FL (ref 35.1–46.3)
STACLOT LA DELTA: 9.6 SECONDS (ref ?–8)
STACLOT LA: POSITIVE
WBC # BLD AUTO: 16 X10(3) UL (ref 4–13)

## 2018-02-10 RX ORDER — HYDROCODONE BITARTRATE AND ACETAMINOPHEN 10; 325 MG/1; MG/1
1 TABLET ORAL EVERY 4 HOURS PRN
Status: DISCONTINUED | OUTPATIENT
Start: 2018-02-10 | End: 2018-02-12

## 2018-02-10 RX ORDER — MORPHINE SULFATE 2 MG/ML
4 INJECTION, SOLUTION INTRAMUSCULAR; INTRAVENOUS EVERY 2 HOUR PRN
Status: DISCONTINUED | OUTPATIENT
Start: 2018-02-10 | End: 2018-02-12

## 2018-02-10 RX ORDER — HYDROCODONE BITARTRATE AND ACETAMINOPHEN 5; 325 MG/1; MG/1
1 TABLET ORAL EVERY 6 HOURS PRN
Status: DISCONTINUED | OUTPATIENT
Start: 2018-02-10 | End: 2018-02-11

## 2018-02-10 RX ORDER — MORPHINE SULFATE 2 MG/ML
2 INJECTION, SOLUTION INTRAMUSCULAR; INTRAVENOUS EVERY 2 HOUR PRN
Status: DISCONTINUED | OUTPATIENT
Start: 2018-02-10 | End: 2018-02-12

## 2018-02-10 RX ORDER — LORAZEPAM 0.5 MG/1
0.5 TABLET ORAL EVERY 6 HOURS PRN
Status: DISCONTINUED | OUTPATIENT
Start: 2018-02-10 | End: 2018-02-12

## 2018-02-10 RX ORDER — HYDROCODONE BITARTRATE AND ACETAMINOPHEN 10; 325 MG/1; MG/1
2 TABLET ORAL EVERY 4 HOURS PRN
Status: DISCONTINUED | OUTPATIENT
Start: 2018-02-10 | End: 2018-02-12

## 2018-02-10 NOTE — PROGRESS NOTES
Acute Pain Service    Continuous Epidural Infusion Follow-up Note    Indication: Post Surgical.      SUBJECTIVE:  Pt states pain is well managed but reports severe itching and is requesting PCEA be discontinued.       OBJECTIVE:    Pain Score:    0/ at r discussed with/by: Pain Nurse - Saumya Lopez RN, and Anesthesia - Dr. Helen Gates

## 2018-02-10 NOTE — PROGRESS NOTES
[facility]  Progress Note    Precious Wills Patient Status:  Inpatient    2000 MRN [id number]   [facility] 3NW-A Attending Kevyn Ceballos MD   [address] Day # 4 PCP Theo Stein MD       SUBJECTIVE:  Overall comfortable  Tolerat HCl (NUBAIN) injection 2.5 mg 2.5 mg Intravenous Q4H PRN   ketorolac tromethamine (TORADOL) 30 MG/ML injection 30 mg 30 mg Intravenous Q6H PRN   dextrose 5 % and 0.9 % NaCl with KCl 20 mEq infusion  Intravenous Continuous   Zolpidem Tartrate (AMBIEN) tab 5

## 2018-02-11 RX ORDER — DOCUSATE SODIUM 100 MG/1
100 CAPSULE, LIQUID FILLED ORAL 2 TIMES DAILY
Status: DISCONTINUED | OUTPATIENT
Start: 2018-02-11 | End: 2018-02-12

## 2018-02-11 NOTE — PLAN OF CARE
Assumed care of pt at [address] [place] [address] during walking rounds pt up in chair. At that time the pt had requested to go back to bed and the off going Rn informed her that she would get the pct in to help her and she then called the pct to do so.  At around 2000 the pt had

## 2018-02-11 NOTE — PLAN OF CARE
Received report from [facility] and assumed care of pt now. Pt calm, cooperative, making conversation. POD # 1. Abdominal incision with staples approximated, open to air. Begun on soft diet today. Abdomen soft, rounded, passing flatus, no nausea.   PCEA i

## 2018-02-11 NOTE — PLAN OF CARE
Received hand off report from [facility]. Alert and oriented x 3. Patient is calm and cooperative. Denies SOB or Cp. Sats >92 on room air. Tele showing ST in 110's. SCD's refused, this nurse reinforced teaching pt verbalized understanding.  Denies n/v. Passin

## 2018-02-11 NOTE — PROGRESS NOTES
[facility]    Progress Note    Nancy Poster Patient Status:  Inpatient    2000 MRN [id number]   [facility] 3NW-A Attending Yvan Antonio MD   Hosp Day # 5 PCP Jean-Claude Dos Santos MD       SUBJECTIVE:  No CP, SOB, or N/V.up in 0.5 mg Oral Q6H PRN   Naloxone HCl (NARCAN) 0.4 MG/ML injection 0.08 mg 0.08 mg Intravenous Q5 Min PRN   DiphenhydrAMINE HCl (BENADRYL) injection 12.5 mg 12.5 mg Intravenous Q4H PRN   dextrose 5 % and 0.9 % NaCl with KCl 20 mEq infusion  Intravenous Contin

## 2018-02-11 NOTE — PROGRESS NOTES
Acute Pain Service    Continuous Epidural Infusion Follow-up Note    Indication: Post Surgical.      SUBJECTIVE:  Pt states pain well managed; feels like she is having more pain but it is managed with Norco. Requesting PCEA be d/c'd.        OBJECTIVE:

## 2018-02-11 NOTE — PROGRESS NOTES
Afebrile. Midline incision intact with staples, no redness or drainage. Bowel sounds active. Pt passing flatus. Tolerating soft diet without nausea. Pain level at 5 currently. Epidural discontinued. Mcdermott to be discontinued.   Mehrdad's negative bilatera

## 2018-02-12 VITALS
DIASTOLIC BLOOD PRESSURE: 82 MMHG | HEART RATE: 91 BPM | RESPIRATION RATE: 18 BRPM | TEMPERATURE: 98 F | OXYGEN SATURATION: 99 % | WEIGHT: 258 LBS | SYSTOLIC BLOOD PRESSURE: 125 MMHG | BODY MASS INDEX: 40 KG/M2

## 2018-02-12 LAB — BLOOD TYPE BARCODE: 6200

## 2018-02-12 PROCEDURE — 99232 SBSQ HOSP IP/OBS MODERATE 35: CPT | Performed by: INTERNAL MEDICINE

## 2018-02-12 RX ORDER — ZOLPIDEM TARTRATE 5 MG/1
5 TABLET ORAL NIGHTLY PRN
Qty: 30 TABLET | Refills: 1 | Status: SHIPPED | OUTPATIENT
Start: 2018-02-12 | End: 2018-02-12

## 2018-02-12 RX ORDER — ZOLPIDEM TARTRATE 5 MG/1
5 TABLET ORAL NIGHTLY PRN
Qty: 30 TABLET | Refills: 1 | Status: SHIPPED | OUTPATIENT
Start: 2018-02-12 | End: 2018-03-20

## 2018-02-12 RX ORDER — HYDROCODONE BITARTRATE AND ACETAMINOPHEN 10; 325 MG/1; MG/1
1 TABLET ORAL EVERY 4 HOURS PRN
Qty: 180 TABLET | Refills: 0 | Status: SHIPPED | OUTPATIENT
Start: 2018-02-12 | End: 2018-02-12

## 2018-02-12 RX ORDER — HYDROCODONE BITARTRATE AND ACETAMINOPHEN 10; 325 MG/1; MG/1
1 TABLET ORAL EVERY 4 HOURS PRN
Qty: 180 TABLET | Refills: 0 | Status: SHIPPED | OUTPATIENT
Start: 2018-02-12 | End: 2018-04-16

## 2018-02-12 NOTE — PROGRESS NOTES
NURSING DISCHARGE NOTE    Discharged pt via wheelchair to [facility]. Accompanied by mother. Belongings at hand. IV catheter d/c'd; catheter intact. Discharge instruction and education given to pt and verbalizes understanding.  Script given at hand and

## 2018-02-12 NOTE — PAYOR COMM NOTE
--------------  CONTINUED STAY REVIEW    Payor: [employer]  Subscriber #:  [id number]  Authorization Number: [id number]    Admit date: 2/6/18  Admit time: 2005    Admitting Physician: Lawson Lundberg MD  Attending Physician:  Lawson Lundberg MD  Primary diffusely tender, distended, bandage over midline incision  MSK: Full range of motion in extremities, no clubbing, no cyanosis  Skin: no rashes or lesions     Data Review:      Labs:         Imaging:        Meds:      Current Facility-Administered Medicati ACL (anterior cruciate ligament) tear     Sprain of calcaneofibular (ligament) of ankle     Osteochondritis dissecans     Hallux valgus (acquired)     Lumbar strain     Fall during sporting event     Osteochondroma     Left hip pain     Cellulitis     Left (PF) 2 MG/ML injection 4 mg 4 mg Intravenous Q2H PRN   HYDROcodone-acetaminophen (NORCO)  MG per tab 1 tablet 1 tablet Oral Q4H PRN   Or         HYDROcodone-acetaminophen (NORCO)  MG per tab 2 tablet 2 tablet Oral Q4H PRN   ondansetron HCl (ZOF pain.   O: BP 96/48 (BP Location: Right arm)   Pulse 90   Temp (!) 97.4 °F (36.3 °C) (Oral)   Resp 18   Wt 258 lb   LMP 01/29/2018   SpO2 98%   BMI 40.41 kg/m²   Gen: AAOx3  CV: RRR with normal S1 / S2  HEENT: sclerae anicteric  Abd: (+)BS, soft, non-tende partial omentectomy.     ASSISTANT:  Juli Taylor PA-C.      INDICATION:  The patient is a 20-year-old admitted to the hospital with vague abdominal pain and ascites, and during the workup was found to have massive ascites and right adnexal mass.   Sh irrigated, and inspection was performed. There was no evidence of any bleeding. All laps, sponge, and instruments were removed, and the counts were correct.   Then, closure of the abdominal cavity was performed, closing the fascia with looped PDS and skin

## 2018-02-12 NOTE — PROGRESS NOTES
Heme/Onc Progress Note    Patient Name: Nancy Poster   YOB: 2000   Medical Record Number: [id number]   CSN: [id number]   Attending Physician: Pan Hwang M.D. Subjective:  Not as sore today.  Anxious to go home    Objective:    Vitals: affect are appropriate. Impression and Plan:  Right adnexal mass with ascites and findings suspicious for peritoneal carcinomatosis- ex lap with removal of mass/biopsy today Friday.  Ascites cytology positive for malignant cells favoring a gyn primar

## 2018-02-26 NOTE — DISCHARGE SUMMARY
[facility]  Discharge Summary    Benjy Swift Patient Status:  Inpatient    2000 MRN [id number]   [facility] 3NW-A Attending No att. providers found   Hosp Day # 6 PCP Eyal Zhang MD     Date of Admission: 2018    Date o Print Script, Disp-30 tablet, R-1      CONTINUE these medications which have NOT CHANGED    Betamethasone Dipropionate 0.05 % External Ointment  Apply to AA BID two weeks on two weeks off, then PRN, Normal, Disp-50 g, R-3    Naproxen Sodium (ALEVE OR)  Liz Hall

## 2018-02-26 NOTE — PROGRESS NOTES
[facility]    Progress Note    Lisa Calvert Patient Status:  Inpatient    2000 MRN [id number]   [facility] 3NW-A Attending No att. providers found   Hosp Day # 6 PCP Petar Brown MD   THIS NOTE IS FOR VISIT 18 WHICH I PENDING WHICH WILL DETERMINE TREATMENT FROM THIS POINT.     ASCITES-MUCH BETTER AFTER ASPIRATATION    ABDL PAIN-IMPROVING    PSORIASIS-MEDS ON HOLD FOR NOW    GENL-D/C TODAY    Total Time spent with patient and coordinating care:  >31 minutes            Car

## 2018-02-28 ENCOUNTER — LAB ENCOUNTER (OUTPATIENT)
Dept: LAB | Facility: HOSPITAL | Age: 18
End: 2018-02-28
Attending: INTERNAL MEDICINE
Payer: COMMERCIAL

## 2018-02-28 DIAGNOSIS — D49.9: Primary | ICD-10-CM

## 2018-03-05 ENCOUNTER — HOSPITAL ENCOUNTER (OUTPATIENT)
Dept: ULTRASOUND IMAGING | Facility: HOSPITAL | Age: 18
Discharge: HOME | End: 2018-03-05
Attending: INTERNAL MEDICINE
Payer: COMMERCIAL

## 2018-03-05 DIAGNOSIS — R19.09 UMBILICAL SWELLING: ICD-10-CM

## 2018-03-05 PROCEDURE — 76705 ECHO EXAM OF ABDOMEN: CPT | Performed by: INTERNAL MEDICINE

## 2018-03-20 RX ORDER — SODIUM CHLORIDE 9 MG/ML
INJECTION, SOLUTION INTRAVENOUS CONTINUOUS
Status: CANCELLED | OUTPATIENT
Start: 2018-03-20

## 2018-03-20 RX ORDER — FLUMAZENIL 0.1 MG/ML
0.2 INJECTION, SOLUTION INTRAVENOUS AS NEEDED
Status: CANCELLED | OUTPATIENT
Start: 2018-03-20

## 2018-03-20 RX ORDER — NALOXONE HYDROCHLORIDE 0.4 MG/ML
80 INJECTION, SOLUTION INTRAMUSCULAR; INTRAVENOUS; SUBCUTANEOUS AS NEEDED
Status: CANCELLED | OUTPATIENT
Start: 2018-03-20

## 2018-03-20 RX ORDER — MIDAZOLAM HYDROCHLORIDE 1 MG/ML
1 INJECTION INTRAMUSCULAR; INTRAVENOUS EVERY 5 MIN PRN
Status: CANCELLED | OUTPATIENT
Start: 2018-03-21 | End: 2018-03-21

## 2018-03-20 NOTE — PAT NURSING NOTE
RN verified with Speedy Walker RN in radiology that the H and P from Dr. Sandra Almanzar from 2/26/18 is ok to use tomorrow. ON the note  Wrote the visit was from 2/12/18, but is acceptable to use for radiology.

## 2018-03-21 ENCOUNTER — APPOINTMENT (OUTPATIENT)
Dept: LAB | Facility: HOSPITAL | Age: 18
End: 2018-03-21
Attending: OBSTETRICS & GYNECOLOGY
Payer: COMMERCIAL

## 2018-03-21 ENCOUNTER — HOSPITAL ENCOUNTER (OUTPATIENT)
Dept: ULTRASOUND IMAGING | Facility: HOSPITAL | Age: 18
Discharge: HOME | End: 2018-03-21
Attending: OBSTETRICS & GYNECOLOGY
Payer: COMMERCIAL

## 2018-03-21 VITALS
BODY MASS INDEX: 40.49 KG/M2 | OXYGEN SATURATION: 98 % | SYSTOLIC BLOOD PRESSURE: 115 MMHG | HEIGHT: 67 IN | TEMPERATURE: 99 F | DIASTOLIC BLOOD PRESSURE: 71 MMHG | HEART RATE: 83 BPM | RESPIRATION RATE: 16 BRPM | WEIGHT: 258 LBS

## 2018-03-21 DIAGNOSIS — C56.1 CANCER OF OVARY, RIGHT (HCC): ICD-10-CM

## 2018-03-21 DIAGNOSIS — C56.1 MALIGNANT NEOPLASM OF RIGHT OVARY (HCC): ICD-10-CM

## 2018-03-21 DIAGNOSIS — S30.1XXA ABDOMINAL WALL SEROMA: ICD-10-CM

## 2018-03-21 DIAGNOSIS — C56.2 MALIGNANT NEOPLASM OF LEFT OVARY (HCC): ICD-10-CM

## 2018-03-21 DIAGNOSIS — C56.2 CANCER OF OVARY, LEFT (HCC): ICD-10-CM

## 2018-03-21 DIAGNOSIS — C56.1 MALIGNANT NEOPLASM OF RIGHT OVARY (HCC): Primary | ICD-10-CM

## 2018-03-21 LAB
ERYTHROCYTE [DISTWIDTH] IN BLOOD BY AUTOMATED COUNT: 13.3 % (ref 11.5–16)
HCT VFR BLD AUTO: 45.7 % (ref 34–50)
HGB BLD-MCNC: 14.5 G/DL (ref 12–16)
INR BLD: 0.97 (ref 0.9–1.1)
MCH RBC QN AUTO: 27.4 PG (ref 27–33.2)
MCHC RBC AUTO-ENTMCNC: 31.7 G/DL (ref 31–37)
MCV RBC AUTO: 86.2 FL (ref 81–100)
PLATELET # BLD AUTO: 485 10(3)UL (ref 150–450)
PSA SERPL DL<=0.01 NG/ML-MCNC: 13.3 SECONDS (ref 12.4–14.7)
RBC # BLD AUTO: 5.3 X10(6)UL (ref 3.8–5.1)
RED CELL DISTRIBUTION WIDTH-SD: 41.5 FL (ref 35.1–46.3)
WBC # BLD AUTO: 7.8 X10(3) UL (ref 4–13)

## 2018-03-21 PROCEDURE — 76942 ECHO GUIDE FOR BIOPSY: CPT | Performed by: OBSTETRICS & GYNECOLOGY

## 2018-03-21 PROCEDURE — 87116 MYCOBACTERIA CULTURE: CPT | Performed by: OBSTETRICS & GYNECOLOGY

## 2018-03-21 PROCEDURE — 87102 FUNGUS ISOLATION CULTURE: CPT | Performed by: OBSTETRICS & GYNECOLOGY

## 2018-03-21 PROCEDURE — 36415 COLL VENOUS BLD VENIPUNCTURE: CPT

## 2018-03-21 PROCEDURE — 87206 SMEAR FLUORESCENT/ACID STAI: CPT | Performed by: OBSTETRICS & GYNECOLOGY

## 2018-03-21 PROCEDURE — 10140 I&D HMTMA SEROMA/FLUID COLLJ: CPT | Performed by: OBSTETRICS & GYNECOLOGY

## 2018-03-21 PROCEDURE — 85027 COMPLETE CBC AUTOMATED: CPT

## 2018-03-21 PROCEDURE — 87205 SMEAR GRAM STAIN: CPT | Performed by: OBSTETRICS & GYNECOLOGY

## 2018-03-21 PROCEDURE — 85610 PROTHROMBIN TIME: CPT

## 2018-03-21 PROCEDURE — 88342 IMHCHEM/IMCYTCHM 1ST ANTB: CPT | Performed by: OBSTETRICS & GYNECOLOGY

## 2018-03-21 PROCEDURE — 88108 CYTOPATH CONCENTRATE TECH: CPT | Performed by: OBSTETRICS & GYNECOLOGY

## 2018-03-21 PROCEDURE — 87070 CULTURE OTHR SPECIMN AEROBIC: CPT | Performed by: OBSTETRICS & GYNECOLOGY

## 2018-03-21 PROCEDURE — 88305 TISSUE EXAM BY PATHOLOGIST: CPT | Performed by: OBSTETRICS & GYNECOLOGY

## 2018-03-21 PROCEDURE — 88341 IMHCHEM/IMCYTCHM EA ADD ANTB: CPT | Performed by: OBSTETRICS & GYNECOLOGY

## 2018-03-21 PROCEDURE — 10160 PNXR ASPIR ABSC HMTMA BULLA: CPT | Performed by: OBSTETRICS & GYNECOLOGY

## 2018-03-21 RX ORDER — FLUMAZENIL 0.1 MG/ML
0.2 INJECTION, SOLUTION INTRAVENOUS AS NEEDED
Status: DISCONTINUED | OUTPATIENT
Start: 2018-03-21 | End: 2018-03-23

## 2018-03-21 RX ORDER — NALOXONE HYDROCHLORIDE 0.4 MG/ML
80 INJECTION, SOLUTION INTRAMUSCULAR; INTRAVENOUS; SUBCUTANEOUS AS NEEDED
Status: DISCONTINUED | OUTPATIENT
Start: 2018-03-21 | End: 2018-03-23

## 2018-03-21 RX ORDER — SODIUM CHLORIDE 9 MG/ML
INJECTION, SOLUTION INTRAVENOUS CONTINUOUS
Status: DISCONTINUED | OUTPATIENT
Start: 2018-03-21 | End: 2018-03-23

## 2018-03-21 RX ORDER — MIDAZOLAM HYDROCHLORIDE 1 MG/ML
1 INJECTION INTRAMUSCULAR; INTRAVENOUS EVERY 5 MIN PRN
Status: ACTIVE | OUTPATIENT
Start: 2018-03-21 | End: 2018-03-21

## 2018-03-21 NOTE — PROCEDURES
[facility]  Procedure Note    Precious Wills Patient Status:  Outpatient    2000 MRN [id number]   Location [address] [place] [address] Attending Gina Jerry MD   [facility] Day # 0 PCP Theo Stein MD     Procedure: US guided aspiration    P

## 2018-03-22 LAB — NON GYNE INTERPRETATION: NEGATIVE

## 2018-04-06 ENCOUNTER — HOSPITAL ENCOUNTER (OUTPATIENT)
Dept: CT IMAGING | Age: 18
Discharge: HOME | End: 2018-04-06
Attending: INTERNAL MEDICINE
Payer: COMMERCIAL

## 2018-04-06 DIAGNOSIS — C56.9: ICD-10-CM

## 2018-04-06 DIAGNOSIS — R19.8 ABDOMEN ENLARGED: ICD-10-CM

## 2018-04-06 PROCEDURE — 74177 CT ABD & PELVIS W/CONTRAST: CPT | Performed by: INTERNAL MEDICINE

## 2018-04-18 ENCOUNTER — APPOINTMENT (OUTPATIENT)
Dept: LAB | Facility: HOSPITAL | Age: 18
End: 2018-04-18
Attending: INTERNAL MEDICINE
Payer: COMMERCIAL

## 2018-04-18 ENCOUNTER — HOSPITAL ENCOUNTER (OUTPATIENT)
Dept: ULTRASOUND IMAGING | Facility: HOSPITAL | Age: 18
Discharge: HOME | End: 2018-04-18
Attending: INTERNAL MEDICINE
Payer: COMMERCIAL

## 2018-04-23 NOTE — IMAGING NOTE
Pt down from inpatient unit for US guided paracentesis. Pt tolerated procedure well. VSS on room air. Presently denies pain, Tegaderm dressing is CDI to RLQ. Pt updated on procedure and plan of care.  Report called to [facility] RN and transported to [facility] #[id number] via needle stick

## 2018-04-25 ENCOUNTER — HOSPITAL ENCOUNTER (OUTPATIENT)
Dept: ULTRASOUND IMAGING | Facility: HOSPITAL | Age: 18
Discharge: HOME | End: 2018-04-25
Attending: INTERNAL MEDICINE
Payer: COMMERCIAL

## 2018-04-25 ENCOUNTER — APPOINTMENT (OUTPATIENT)
Dept: LAB | Facility: HOSPITAL | Age: 18
End: 2018-04-25
Attending: INTERNAL MEDICINE
Payer: COMMERCIAL

## 2018-04-25 VITALS
WEIGHT: 240 LBS | SYSTOLIC BLOOD PRESSURE: 118 MMHG | BODY MASS INDEX: 37.67 KG/M2 | HEART RATE: 103 BPM | HEIGHT: 67 IN | DIASTOLIC BLOOD PRESSURE: 81 MMHG | RESPIRATION RATE: 17 BRPM | OXYGEN SATURATION: 97 % | TEMPERATURE: 98 F

## 2018-04-25 DIAGNOSIS — J90 PLEURAL EFFUSION, RIGHT: Primary | ICD-10-CM

## 2018-04-25 DIAGNOSIS — J90 PLEURAL EFFUSION, RIGHT: ICD-10-CM

## 2018-04-25 DIAGNOSIS — J90 PLEURAL EFFUSION: ICD-10-CM

## 2018-04-25 PROCEDURE — 36415 COLL VENOUS BLD VENIPUNCTURE: CPT

## 2018-04-25 PROCEDURE — 85027 COMPLETE CBC AUTOMATED: CPT

## 2018-04-25 PROCEDURE — 76604 US EXAM CHEST: CPT | Performed by: INTERNAL MEDICINE

## 2018-04-25 PROCEDURE — 85610 PROTHROMBIN TIME: CPT

## 2018-04-25 PROCEDURE — 32555 ASPIRATE PLEURA W/ IMAGING: CPT | Performed by: INTERNAL MEDICINE

## 2018-04-25 NOTE — IMAGING NOTE
Tiffanie Cueto. called to let her know that the patient was discharged home from ultrasound as no fluid was found per Jessica Chamberlain.

## 2018-04-25 NOTE — IMAGING NOTE
Loculated pleural fluid vs pericardial cyst on CT. Referred for thora. In upright position, the fluid does not become dependent. No fluid for thora. D/w pt and mom.

## 2018-06-16 ENCOUNTER — LAB ENCOUNTER (OUTPATIENT)
Dept: LAB | Facility: HOSPITAL | Age: 18
End: 2018-06-16
Attending: PHYSICIAN ASSISTANT
Payer: COMMERCIAL

## 2018-06-16 DIAGNOSIS — Z79.899 HIGH RISK MEDICATION USE: ICD-10-CM

## 2018-06-16 PROCEDURE — 86480 TB TEST CELL IMMUN MEASURE: CPT

## 2018-06-16 PROCEDURE — 80053 COMPREHEN METABOLIC PANEL: CPT

## 2018-06-16 PROCEDURE — 85025 COMPLETE CBC W/AUTO DIFF WBC: CPT

## 2018-06-16 PROCEDURE — 80074 ACUTE HEPATITIS PANEL: CPT

## 2018-06-16 PROCEDURE — 36415 COLL VENOUS BLD VENIPUNCTURE: CPT

## 2019-01-17 ENCOUNTER — HOSPITAL ENCOUNTER (EMERGENCY)
Facility: HOSPITAL | Age: 19
Discharge: HOME | End: 2019-01-17
Attending: EMERGENCY MEDICINE
Payer: COMMERCIAL

## 2019-01-17 VITALS
WEIGHT: 260 LBS | OXYGEN SATURATION: 100 % | TEMPERATURE: 97 F | HEART RATE: 86 BPM | SYSTOLIC BLOOD PRESSURE: 132 MMHG | BODY MASS INDEX: 41 KG/M2 | DIASTOLIC BLOOD PRESSURE: 82 MMHG | RESPIRATION RATE: 20 BRPM

## 2019-01-17 DIAGNOSIS — G43.909 MIGRAINE WITHOUT STATUS MIGRAINOSUS, NOT INTRACTABLE, UNSPECIFIED MIGRAINE TYPE: Primary | ICD-10-CM

## 2019-01-17 PROCEDURE — 99284 EMERGENCY DEPT VISIT MOD MDM: CPT

## 2019-01-17 PROCEDURE — 96374 THER/PROPH/DIAG INJ IV PUSH: CPT

## 2019-01-17 PROCEDURE — 96375 TX/PRO/DX INJ NEW DRUG ADDON: CPT

## 2019-01-17 PROCEDURE — 96361 HYDRATE IV INFUSION ADD-ON: CPT

## 2019-01-17 RX ORDER — ONDANSETRON 4 MG/1
4 TABLET, ORALLY DISINTEGRATING ORAL EVERY 8 HOURS PRN
Qty: 10 TABLET | Refills: 0 | Status: SHIPPED | OUTPATIENT
Start: 2019-01-17 | End: 2019-01-24

## 2019-01-17 RX ORDER — KETOROLAC TROMETHAMINE 10 MG/1
10 TABLET, FILM COATED ORAL EVERY 6 HOURS PRN
Qty: 20 TABLET | Refills: 0 | Status: SHIPPED | OUTPATIENT
Start: 2019-01-17 | End: 2019-01-22

## 2019-01-17 RX ORDER — MECLIZINE HYDROCHLORIDE 25 MG/1
25 TABLET ORAL ONCE
Status: COMPLETED | OUTPATIENT
Start: 2019-01-17 | End: 2019-01-17

## 2019-01-17 RX ORDER — DIPHENHYDRAMINE HYDROCHLORIDE 50 MG/ML
50 INJECTION INTRAMUSCULAR; INTRAVENOUS ONCE
Status: COMPLETED | OUTPATIENT
Start: 2019-01-17 | End: 2019-01-17

## 2019-01-17 RX ORDER — ONDANSETRON 2 MG/ML
4 INJECTION INTRAMUSCULAR; INTRAVENOUS ONCE
Status: COMPLETED | OUTPATIENT
Start: 2019-01-17 | End: 2019-01-17

## 2019-01-17 RX ORDER — ACETAMINOPHEN 500 MG
1000 TABLET ORAL ONCE
Status: COMPLETED | OUTPATIENT
Start: 2019-01-17 | End: 2019-01-17

## 2019-01-17 RX ORDER — KETOROLAC TROMETHAMINE 30 MG/ML
30 INJECTION, SOLUTION INTRAMUSCULAR; INTRAVENOUS ONCE
Status: COMPLETED | OUTPATIENT
Start: 2019-01-17 | End: 2019-01-17

## 2019-01-17 NOTE — ED PROVIDER NOTES
Patient Seen in: [facility] Emergency Department    History   Patient presents with:  Headache (neurologic)    Stated Complaint: MIGRAINE FOR 2 WEEKS. Ambulatory. Able to hold appropriate conversation.     HPI    Patient is a 76-year-old with history systems are as noted in HPI. Constitutional and vital signs reviewed. All other systems reviewed and negative except as noted above.     Physical Exam     ED Triage Vitals [01/17/19 1531]   BP (!) 156/91   Pulse 112   Resp 24   Temp 96.8 °F (36 °C) currently breastfeeding. Signed out to me from Dr. Gracie Ram. Patient currently stating significantly improved headache and comfortable discharge home.     I have considered other serious etiologies for this patient's complaints, however the presentation i

## 2019-01-17 NOTE — ED INITIAL ASSESSMENT (HPI)
20 y/o female to ED with c/o of migraine x2 weeks. Per patient she saw PCP last week, they prescribe medication \"migraine cocktail, but by mouth form\", patient reports medication did not aid with migraine.  Vomited yesterday, instructed to come to ED per

## 2019-04-22 ENCOUNTER — APPOINTMENT (OUTPATIENT)
Dept: GENERAL RADIOLOGY | Age: 19
End: 2019-04-22
Attending: EMERGENCY MEDICINE
Payer: COMMERCIAL

## 2019-04-22 ENCOUNTER — HOSPITAL ENCOUNTER (OUTPATIENT)
Age: 19
Discharge: HOME | End: 2019-04-22
Attending: EMERGENCY MEDICINE
Payer: COMMERCIAL

## 2019-04-22 VITALS
OXYGEN SATURATION: 100 % | RESPIRATION RATE: 18 BRPM | DIASTOLIC BLOOD PRESSURE: 92 MMHG | TEMPERATURE: 98 F | WEIGHT: 240 LBS | HEART RATE: 114 BPM | HEIGHT: 67 IN | BODY MASS INDEX: 37.67 KG/M2 | SYSTOLIC BLOOD PRESSURE: 126 MMHG

## 2019-04-22 DIAGNOSIS — S80.212A ABRASION OF LEFT KNEE, INITIAL ENCOUNTER: ICD-10-CM

## 2019-04-22 DIAGNOSIS — S63.502A SPRAIN OF LEFT WRIST, INITIAL ENCOUNTER: Primary | ICD-10-CM

## 2019-04-22 PROCEDURE — 73110 X-RAY EXAM OF WRIST: CPT | Performed by: EMERGENCY MEDICINE

## 2019-04-22 PROCEDURE — 73090 X-RAY EXAM OF FOREARM: CPT | Performed by: EMERGENCY MEDICINE

## 2019-04-22 PROCEDURE — 99204 OFFICE O/P NEW MOD 45 MIN: CPT

## 2019-04-22 PROCEDURE — 99214 OFFICE O/P EST MOD 30 MIN: CPT

## 2019-04-22 PROCEDURE — 73130 X-RAY EXAM OF HAND: CPT | Performed by: EMERGENCY MEDICINE

## 2019-04-22 NOTE — ED INITIAL ASSESSMENT (HPI)
Pt presents to the IC with c/o left knee abrasion and left wrist pain s/kp falling today while missing a ledge. No head injury. Pain radiates up to the elbow.

## 2019-04-22 NOTE — ED PROVIDER NOTES
Patient Seen in: [address]    History   Patient presents with:  Upper Extremity Injury (musculoskeletal)  Rash Skin Problem (integumentary)    Stated Complaint: left wrist pain    HPI    The patient is a 17-year-old fem reviewed. All other systems reviewed and negative except as noted above.     Physical Exam     ED Triage Vitals [04/22/19 1631]   BP (!) 126/92   Pulse 114   Resp 18   Temp 98.2 °F (36.8 °C)   Temp src Oral   SpO2 100 %   O2 Device None (Room air) [id number] [phone]    In 1 week  If symptoms worsen        Medications Prescribed:  Current Discharge Medication List

## 2019-07-26 ENCOUNTER — HOSPITAL ENCOUNTER (OUTPATIENT)
Age: 19
Discharge: HOME | End: 2019-07-26
Attending: FAMILY MEDICINE
Payer: COMMERCIAL

## 2019-07-26 VITALS
HEART RATE: 123 BPM | DIASTOLIC BLOOD PRESSURE: 86 MMHG | RESPIRATION RATE: 18 BRPM | OXYGEN SATURATION: 97 % | TEMPERATURE: 97 F | SYSTOLIC BLOOD PRESSURE: 133 MMHG

## 2019-07-26 DIAGNOSIS — L72.3 SEBACEOUS CYST OF EAR: Primary | ICD-10-CM

## 2019-07-26 PROCEDURE — 99204 OFFICE O/P NEW MOD 45 MIN: CPT

## 2019-07-26 PROCEDURE — 99213 OFFICE O/P EST LOW 20 MIN: CPT

## 2019-07-26 PROCEDURE — 10160 PNXR ASPIR ABSC HMTMA BULLA: CPT

## 2019-07-26 RX ORDER — CEPHALEXIN 500 MG/1
500 CAPSULE ORAL 2 TIMES DAILY
Qty: 14 CAPSULE | Refills: 0 | Status: SHIPPED | OUTPATIENT
Start: 2019-07-26

## 2019-07-26 RX ORDER — LIDOCAINE HYDROCHLORIDE 20 MG/ML
10 SOLUTION OROPHARYNGEAL ONCE
Status: COMPLETED | OUTPATIENT
Start: 2019-07-26 | End: 2019-07-26

## 2019-07-26 NOTE — ED PROVIDER NOTES
Patient Seen in: [address] [facility]    History   Patient presents with:  Ear Lesion    Stated Complaint: cyst behind left ear x1 week    HPI    This 78-year-old female presents to the office with complaint of a painful lump behind Current:/86   Pulse (!) 123   Temp 97 °F (36.1 °C) (Temporal)   Resp 18   LMP 07/15/2019 (Exact Date)   SpO2 97%         Physical Exam    General: WH/obese/WD, in moderate discomfort due to ear pain, A and O times 3  HEAD: Normocephalic, atraum the cyst.  You may apply warm compresses for 10 to 15 minutes twice daily to help with the swelling. Do not poke at the cyst.  You may take Tylenol or ibuprofen for pain.   Follow-up with your primary doctor in 3 to 5 days for any new or worsening symptoms

## 2022-11-12 NOTE — PROGRESS NOTES
[facility]    Progress Note    Millie Michel Patient Status:  Inpatient    2000 MRN [id number]   [facility] 3NW-A Attending Nasra Caraballo MD   Hosp Day # 4 PCP Lo Mendiola MD       SUBJECTIVE:  No CP, SOB, or N/V.     O Q4H PRN   HYDROcodone-acetaminophen (NORCO) 5-325 MG per tab 1 tablet 1 tablet Oral Q6H PRN   ondansetron HCl (ZOFRAN) injection 4 mg 4 mg Intravenous Q6H PRN   Naloxone HCl (NARCAN) 0.4 MG/ML injection 0.08 mg 0.08 mg Intravenous Q5 Min PRN   DiphenhydrAM controlled    Dehydration-cont IVF    Psoriasis-off all but topicals for now.           To [place]  2/10/2018  12:50 PM CXR negative - No pneumothorax, No opacities, No free air
